# Patient Record
Sex: FEMALE | Race: WHITE | URBAN - METROPOLITAN AREA
[De-identification: names, ages, dates, MRNs, and addresses within clinical notes are randomized per-mention and may not be internally consistent; named-entity substitution may affect disease eponyms.]

---
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
## 2020-04-29 NOTE — TELEPHONE ENCOUNTER
FUTURE VISIT INFORMATION      FUTURE VISIT INFORMATION:    Date: 5/19/20    Time: 1:00pm    [place] [facility]  REFERRAL INFORMATION:    Referring provider: self    Referring providers clinic:  N/A    Reason for visit/diagnosis  Top consult    RECORDS REQUESTED FROM:       No recs to collect

## 2020-05-12 ENCOUNTER — PATIENT OUTREACH (OUTPATIENT)
Dept: PLASTIC SURGERY | Facility: CLINIC | Age: 20
End: 2020-05-12

## 2020-05-12 NOTE — PROGRESS NOTES
Pt called asking if 5/19/20 consult will be virtual or in person.     Called pt back, LVM, asked for pt to call back to discuss and set up mychart.     Antoine Avina, [facility]  Transgender Care Coordinator

## 2020-05-12 NOTE — PROGRESS NOTES
Mailed consult packet for TOP virtual consult with . Will call pt to follow up to complete social work assessment and review consult packet.     Antoine Avina, [facility]  Transgender Care Coordinator

## 2020-05-15 ENCOUNTER — PATIENT OUTREACH (OUTPATIENT)
Dept: PLASTIC SURGERY | Facility: CLINIC | Age: 20
End: 2020-05-15

## 2020-05-15 NOTE — PROGRESS NOTES
[facility]:  Care Coordination Note     SITUATION   Patient (Jose Manuel, he/him) is a 20 year old who is receiving support for:  Clinic Care Coordination - Follow-up (social work assessment )  .    BACKGROUND     Pt has upcoming virtual top consult with Dr. Real on 5/19/20.     ASSESSMENT     Surgery              [facility] Assessment  Comprehensive Gender Care ([facility]) Enrollment: Enrolled  Patient has a therapist: Yes  Name of therapist: [employer] [facility] counselor from [facility]  Letter of support #1: Requested  Surgery being considered: Yes  Mastectomy: Yes    Pts mother will help pt with aftercare. 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       PLAN          Nursing Interventions:   [facility] program and services discussed with patient. Educational surgical packet provided and reviewed with patient. Process for accessing surgery discussed, including: WPATH standards of care, letters of support, treatment plan action steps, [place] insurance process, surgery scheduling, and approximate timeline.   Pt questions answered within scope of practice.     Follow-up plan:    1. Pt to obtain letter of support   2. Pt to likely need mammogram.          Antoine Avina

## 2020-05-19 ENCOUNTER — PRE VISIT (OUTPATIENT)
Dept: SURGERY | Facility: CLINIC | Age: 20
End: 2020-05-19

## 2020-05-19 ENCOUNTER — VIRTUAL VISIT (OUTPATIENT)
Dept: PLASTIC SURGERY | Facility: CLINIC | Age: 20
End: 2020-05-19
Attending: PLASTIC SURGERY
Payer: COMMERCIAL

## 2020-05-19 VITALS — BODY MASS INDEX: 29.27 KG/M2 | WEIGHT: 155 LBS | HEIGHT: 61 IN

## 2020-05-19 DIAGNOSIS — F64.0 GENDER DYSPHORIA IN ADOLESCENT AND ADULT: Primary | ICD-10-CM

## 2020-05-19 RX ORDER — TESTOSTERONE CYPIONATE 200 MG/ML
VIAL (ML) INTRAMUSCULAR
COMMUNITY
Start: 2019-12-23

## 2020-05-19 SDOH — HEALTH STABILITY: MENTAL HEALTH: HOW OFTEN DO YOU HAVE A DRINK CONTAINING ALCOHOL?: NEVER

## 2020-05-19 ASSESSMENT — MIFFLIN-ST. JEOR: SCORE: 1410.46

## 2020-05-19 ASSESSMENT — PAIN SCALES - GENERAL: PAINLEVEL: NO PAIN (0)

## 2020-05-19 NOTE — LETTER
"5/19/2020     RE: Analisa Spivey  [address] [address]     Dear Colleague,    Thank you for referring your patient, Analisa Spivey, to the [facility] PLASTIC AND RECONSTRUCTIVE SURGERY at [facility]. Please see a copy of my visit note below.    PLASTICS NEW TOP     Jose Manuel is being evaluated via a billable video visit.      The patient has been notified of following:     \"This video visit will be conducted via a call between you and your physician/provider. We have found that certain health care needs can be provided without the need for an in-person physical exam.  This service lets us provide the care you need with a video conversation.  If a prescription is necessary we can send it directly to your pharmacy.  If lab work is needed we can place an order for that and you can then stop by our lab to have the test done at a later time.    If during the course of the call the physician/provider feels a video visit is not appropriate, you will not be charged for this service.\"     Patient has given verbal consent for Video visit? yes    Start time: 12:55    Stop time: 1:25    HPI: This is a 20 year old biological female transitioning to male with a history of gender dysphoria and who presents today by video for a consultation for top surgery. He prefers pronouns he/him and preferred name is Jose Manuel.  He was referred by his internet search and made his appointment 9 months ago. His therapist is Inocencia Elliott at [facility] for the past couple months.  He is on testosterone from his PMD Dr Victoria, and he has been on for 2 years. He has been living his chosen gender identity/role for 3 years.  He binds only occasionally.  No breast lumps, skin puckering, nipple drainage, or other breast problems. No history of breast imaging, including mammogram or breast ultrasound.     Medical Hx: Gender dysphoria. No history of asthma, diabetes mellitus, or GERD. No bleeding, clotting, healing or " "scarring problems. Does have anxiety and depression - on Fluoxetine from PMD.     Surgical Hx: 3rd molar extractions under GA.    Family Hx: No family history of breast or ovarian cancer.  MGF - CAD, HTN, MI. Paternal side - aunts and uncles with DM and obesity. Dad - obesity, hypercholesterolemia.    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 Works PT at [employer] as well as computer and [employer] duties. Relationship status/family: supportive and will provide postop cares. Single.  Smoking status: none Alcohol use: occasional social. Diet: omnivore leaning toward vegan. Trying meat substitutes for protein source. Some coffee. No pop.  Exercise: walks and bikes.  Sleep: 6-8 hours.      Vitals: Ht 1.549 m (5' 1\")   Wt 70.3 kg (155 lb)   BMI 29.29 kg/m    PE: General: Height: 5' 1\" Weight: 155 lbs 0 oz   Chest: Jose Manuel did not feel comfortable showing his chest on video. We did not have any photos to evaluate. They do feel that their breast volume is too large for keyhole technique and will require double incision. A formal exam with photos will be done when he can come to clinic in person.     A&P: 20 year old biological female transitioning to male who is a good candidate for gender affirming top surgery with one bilateral simple mastectomy with probable nipple graft reconstruction.  The patient will need a pre-op mammogram. He will also need a history and physical. His letter of support is also pending.    He received an informational packet in the mail from our Transgender Coordinator and will have an opportunity to talk with Antoine about any questions or concerns regarding the process leading to surgery. Any further discussion of risks and complications will be reviewed during the pre-op visit. Some of his questions regarding revisions and cost were discussed during this visit.  He is thinking either December or summer 2021 due to school schedule.    Once we " receive his therapist letter of support  we will initiate the prior authorization process. Any breast imaging will need to be completed before surgery if determined necessary after in person exam.     According to [place] Case Law and [facility] standards of care with an appropriate letter of support form a mental health provider top surgery/mastectomy is medically necessary for the treatment of gender dysphoria.     Total time = 30 minutes, over half of which spent discussing surgical options       Gracy Real MD

## 2020-05-19 NOTE — NURSING NOTE
"Chief Complaint   Patient presents with     Consult     Virtual consult for top surgery       Vitals:    05/19/20 1231   Weight: 70.3 kg (155 lb)   Height: 1.549 m (5' 1\")       Body mass index is 29.29 kg/m .      MAXIM HolcombT                      "

## 2020-05-31 NOTE — PROGRESS NOTES
"PLASTICS NEW TOP     Jose Manuel is being evaluated via a billable video visit.      The patient has been notified of following:     \"This video visit will be conducted via a call between you and your physician/provider. We have found that certain health care needs can be provided without the need for an in-person physical exam.  This service lets us provide the care you need with a video conversation.  If a prescription is necessary we can send it directly to your pharmacy.  If lab work is needed we can place an order for that and you can then stop by our lab to have the test done at a later time.    If during the course of the call the physician/provider feels a video visit is not appropriate, you will not be charged for this service.\"     Patient has given verbal consent for Video visit? yes    Start time: 12:55    Stop time: 1:25    HPI: This is a 20 year old biological female transitioning to male with a history of gender dysphoria and who presents today by video for a consultation for top surgery. He prefers pronouns he/him and preferred name is Jose Manuel.  He was referred by his internet search and made his appointment 9 months ago. His therapist is Inocencia Elliott at [facility] for the past couple months.  He is on testosterone from his PMD Dr Victoria, and he has been on for 2 years. He has been living his chosen gender identity/role for 3 years.  He binds only occasionally.  No breast lumps, skin puckering, nipple drainage, or other breast problems. No history of breast imaging, including mammogram or breast ultrasound.     Medical Hx: Gender dysphoria. No history of asthma, diabetes mellitus, or GERD. No bleeding, clotting, healing or scarring problems. Does have anxiety and depression - on Fluoxetine from PMD.     Surgical Hx: 3rd molar extractions under GA.    Family Hx: No family history of breast or ovarian cancer.  MGF - CAD, HTN, MI. Paternal side - aunts and uncles with DM and obesity. Dad - obesity, " "hypercholesterolemia.    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 Relationship status/family: supportive and will provide postop cares. Single.  Smoking status: none Alcohol use: occasional social. Diet: omnivore leaning toward vegan. Trying meat substitutes for protein source. Some coffee. No pop.  Exercise: walks and bikes.  Sleep: 6-8 hours.      Vitals: Ht 1.549 m (5' 1\")   Wt 70.3 kg (155 lb)   BMI 29.29 kg/m    PE: General: Height: 5' 1\" Weight: 155 lbs 0 oz   Chest: Jose Manuel did not feel comfortable showing his chest on video. We did not have any photos to evaluate. They do feel that their breast volume is too large for keyhole technique and will require double incision. A formal exam with photos will be done when he can come to clinic in person.     A&P: 20 year old biological female transitioning to male who is a good candidate for gender affirming top surgery with one bilateral simple mastectomy with probable nipple graft reconstruction.  The patient will need a pre-op mammogram. He will also need a history and physical. His letter of support is also pending.    He received an informational packet in the mail from our Transgender Coordinator and will have an opportunity to talk with Antoine about any questions or concerns regarding the process leading to surgery. Any further discussion of risks and complications will be reviewed during the pre-op visit. Some of his questions regarding revisions and cost were discussed during this visit.  He is thinking either December or summer 2021 due to school schedule.    Once we receive his therapist letter of support  we will initiate the prior authorization process. Any breast imaging will need to be completed before surgery if determined necessary after in person exam.     According to [place] Case Law and WPATH standards of care with an appropriate " letter of support form a mental health provider top surgery/mastectomy is medically necessary for the treatment of gender dysphoria.     Total time = 30 minutes, over half of which spent discussing surgical options

## 2020-10-02 ENCOUNTER — TRANSFERRED RECORDS (OUTPATIENT)
Dept: HEALTH INFORMATION MANAGEMENT | Facility: CLINIC | Age: 20
End: 2020-10-02

## 2020-12-27 ENCOUNTER — HEALTH MAINTENANCE LETTER (OUTPATIENT)
Age: 20
End: 2020-12-27

## 2021-10-09 ENCOUNTER — HEALTH MAINTENANCE LETTER (OUTPATIENT)
Age: 21
End: 2021-10-09

## 2022-01-29 ENCOUNTER — HEALTH MAINTENANCE LETTER (OUTPATIENT)
Age: 22
End: 2022-01-29

## 2022-09-17 ENCOUNTER — HEALTH MAINTENANCE LETTER (OUTPATIENT)
Age: 22
End: 2022-09-17

## 2023-05-06 ENCOUNTER — HEALTH MAINTENANCE LETTER (OUTPATIENT)
Age: 23
End: 2023-05-06